# Patient Record
Sex: MALE | URBAN - METROPOLITAN AREA
[De-identification: names, ages, dates, MRNs, and addresses within clinical notes are randomized per-mention and may not be internally consistent; named-entity substitution may affect disease eponyms.]

---

## 2022-12-30 ENCOUNTER — OFFICE (OUTPATIENT)
Dept: URBAN - METROPOLITAN AREA CLINIC 109 | Facility: CLINIC | Age: 32
Setting detail: OPHTHALMOLOGY
End: 2022-12-30
Payer: COMMERCIAL

## 2022-12-30 DIAGNOSIS — H16.002: ICD-10-CM

## 2022-12-30 PROCEDURE — 99203 OFFICE O/P NEW LOW 30 MIN: CPT | Performed by: OPHTHALMOLOGY

## 2022-12-30 ASSESSMENT — CONFRONTATIONAL VISUAL FIELD TEST (CVF)
OD_FINDINGS: FULL
OS_FINDINGS: FULL

## 2022-12-30 ASSESSMENT — TONOMETRY
OD_IOP_MMHG: 15
OS_IOP_MMHG: 15

## 2022-12-30 ASSESSMENT — VISUAL ACUITY
OS_BCVA: 20/30-2
OD_BCVA: 20/20

## 2022-12-30 ASSESSMENT — LID EXAM ASSESSMENTS: OS_COMMENTS: NO FOREIGN BODY WITH LID EVERTED

## 2022-12-31 ENCOUNTER — OFFICE (OUTPATIENT)
Dept: URBAN - METROPOLITAN AREA CLINIC 38 | Facility: CLINIC | Age: 32
Setting detail: OPHTHALMOLOGY
End: 2022-12-31
Payer: COMMERCIAL

## 2022-12-31 DIAGNOSIS — H16.002: ICD-10-CM

## 2022-12-31 PROCEDURE — 99213 OFFICE O/P EST LOW 20 MIN: CPT | Performed by: OPTOMETRIST

## 2022-12-31 ASSESSMENT — LID EXAM ASSESSMENTS: OS_COMMENTS: NO FOREIGN BODY WITH LID EVERTED

## 2022-12-31 ASSESSMENT — CONFRONTATIONAL VISUAL FIELD TEST (CVF)
OS_FINDINGS: FULL
OD_FINDINGS: FULL

## 2022-12-31 ASSESSMENT — VISUAL ACUITY
OD_BCVA: 20/30-2
OS_BCVA: 20/20-1

## 2023-01-04 ENCOUNTER — OFFICE (OUTPATIENT)
Dept: URBAN - METROPOLITAN AREA CLINIC 109 | Facility: CLINIC | Age: 33
Setting detail: OPHTHALMOLOGY
End: 2023-01-04
Payer: COMMERCIAL

## 2023-01-04 DIAGNOSIS — H16.002: ICD-10-CM

## 2023-01-04 PROCEDURE — 99213 OFFICE O/P EST LOW 20 MIN: CPT | Performed by: OPHTHALMOLOGY

## 2023-01-04 ASSESSMENT — TONOMETRY
OD_IOP_MMHG: 12
OS_IOP_MMHG: 12

## 2023-01-04 ASSESSMENT — CONFRONTATIONAL VISUAL FIELD TEST (CVF)
OS_FINDINGS: FULL
OD_FINDINGS: FULL

## 2023-01-04 ASSESSMENT — VISUAL ACUITY
OD_BCVA: 20/20-2
OS_BCVA: 20/20

## 2023-01-11 ENCOUNTER — OFFICE (OUTPATIENT)
Dept: URBAN - METROPOLITAN AREA CLINIC 109 | Facility: CLINIC | Age: 33
Setting detail: OPHTHALMOLOGY
End: 2023-01-11
Payer: COMMERCIAL

## 2023-01-11 DIAGNOSIS — H16.002: ICD-10-CM

## 2023-01-11 PROCEDURE — 99213 OFFICE O/P EST LOW 20 MIN: CPT | Performed by: OPHTHALMOLOGY

## 2023-01-11 ASSESSMENT — CONFRONTATIONAL VISUAL FIELD TEST (CVF)
OS_FINDINGS: FULL
OD_FINDINGS: FULL

## 2023-01-11 ASSESSMENT — REFRACTION_CURRENTRX
OD_OVR_VA: 20/
OS_SPHERE: -2.75
OS_OVR_VA: 20/
OD_SPHERE: -2.50

## 2023-01-11 ASSESSMENT — TONOMETRY
OD_IOP_MMHG: 12
OS_IOP_MMHG: 14

## 2023-01-11 ASSESSMENT — REFRACTION_MANIFEST
OD_VA1: 20/20
OD_SPHERE: -2.50
OS_VA1: 20/20
OS_SPHERE: -2.75

## 2023-01-11 ASSESSMENT — VISUAL ACUITY
OS_BCVA: 20/20
OD_BCVA: 20/20

## 2023-10-25 ENCOUNTER — EMERGENCY (EMERGENCY)
Facility: HOSPITAL | Age: 33
LOS: 1 days | Discharge: HOME | End: 2023-10-25
Attending: EMERGENCY MEDICINE | Admitting: EMERGENCY MEDICINE
Payer: COMMERCIAL

## 2023-10-25 VITALS
OXYGEN SATURATION: 99 % | HEART RATE: 72 BPM | SYSTOLIC BLOOD PRESSURE: 142 MMHG | TEMPERATURE: 98 F | WEIGHT: 177.03 LBS | HEIGHT: 69 IN | RESPIRATION RATE: 15 BRPM | DIASTOLIC BLOOD PRESSURE: 87 MMHG

## 2023-10-25 LAB
ALBUMIN SERPL ELPH-MCNC: 4.1 G/DL — SIGNIFICANT CHANGE UP (ref 3.3–5)
ALBUMIN SERPL ELPH-MCNC: 4.1 G/DL — SIGNIFICANT CHANGE UP (ref 3.3–5)
ALP SERPL-CCNC: 65 U/L — SIGNIFICANT CHANGE UP (ref 30–120)
ALP SERPL-CCNC: 65 U/L — SIGNIFICANT CHANGE UP (ref 30–120)
ALT FLD-CCNC: 25 U/L — SIGNIFICANT CHANGE UP (ref 10–60)
ALT FLD-CCNC: 25 U/L — SIGNIFICANT CHANGE UP (ref 10–60)
ANION GAP SERPL CALC-SCNC: 4 MMOL/L — LOW (ref 5–17)
ANION GAP SERPL CALC-SCNC: 4 MMOL/L — LOW (ref 5–17)
AST SERPL-CCNC: 21 U/L — SIGNIFICANT CHANGE UP (ref 10–40)
AST SERPL-CCNC: 21 U/L — SIGNIFICANT CHANGE UP (ref 10–40)
BASOPHILS # BLD AUTO: 0.05 K/UL — SIGNIFICANT CHANGE UP (ref 0–0.2)
BASOPHILS # BLD AUTO: 0.05 K/UL — SIGNIFICANT CHANGE UP (ref 0–0.2)
BASOPHILS NFR BLD AUTO: 0.5 % — SIGNIFICANT CHANGE UP (ref 0–2)
BASOPHILS NFR BLD AUTO: 0.5 % — SIGNIFICANT CHANGE UP (ref 0–2)
BILIRUB SERPL-MCNC: 0.4 MG/DL — SIGNIFICANT CHANGE UP (ref 0.2–1.2)
BILIRUB SERPL-MCNC: 0.4 MG/DL — SIGNIFICANT CHANGE UP (ref 0.2–1.2)
BUN SERPL-MCNC: 12 MG/DL — SIGNIFICANT CHANGE UP (ref 7–23)
BUN SERPL-MCNC: 12 MG/DL — SIGNIFICANT CHANGE UP (ref 7–23)
CALCIUM SERPL-MCNC: 9.7 MG/DL — SIGNIFICANT CHANGE UP (ref 8.4–10.5)
CALCIUM SERPL-MCNC: 9.7 MG/DL — SIGNIFICANT CHANGE UP (ref 8.4–10.5)
CHLORIDE SERPL-SCNC: 103 MMOL/L — SIGNIFICANT CHANGE UP (ref 96–108)
CHLORIDE SERPL-SCNC: 103 MMOL/L — SIGNIFICANT CHANGE UP (ref 96–108)
CO2 SERPL-SCNC: 30 MMOL/L — SIGNIFICANT CHANGE UP (ref 22–31)
CO2 SERPL-SCNC: 30 MMOL/L — SIGNIFICANT CHANGE UP (ref 22–31)
CREAT SERPL-MCNC: 1.04 MG/DL — SIGNIFICANT CHANGE UP (ref 0.5–1.3)
CREAT SERPL-MCNC: 1.04 MG/DL — SIGNIFICANT CHANGE UP (ref 0.5–1.3)
EGFR: 97 ML/MIN/1.73M2 — SIGNIFICANT CHANGE UP
EGFR: 97 ML/MIN/1.73M2 — SIGNIFICANT CHANGE UP
EOSINOPHIL # BLD AUTO: 0.27 K/UL — SIGNIFICANT CHANGE UP (ref 0–0.5)
EOSINOPHIL # BLD AUTO: 0.27 K/UL — SIGNIFICANT CHANGE UP (ref 0–0.5)
EOSINOPHIL NFR BLD AUTO: 2.7 % — SIGNIFICANT CHANGE UP (ref 0–6)
EOSINOPHIL NFR BLD AUTO: 2.7 % — SIGNIFICANT CHANGE UP (ref 0–6)
GLUCOSE SERPL-MCNC: 94 MG/DL — SIGNIFICANT CHANGE UP (ref 70–99)
GLUCOSE SERPL-MCNC: 94 MG/DL — SIGNIFICANT CHANGE UP (ref 70–99)
HCT VFR BLD CALC: 45.3 % — SIGNIFICANT CHANGE UP (ref 39–50)
HCT VFR BLD CALC: 45.3 % — SIGNIFICANT CHANGE UP (ref 39–50)
HGB BLD-MCNC: 15.1 G/DL — SIGNIFICANT CHANGE UP (ref 13–17)
HGB BLD-MCNC: 15.1 G/DL — SIGNIFICANT CHANGE UP (ref 13–17)
IMM GRANULOCYTES NFR BLD AUTO: 0.2 % — SIGNIFICANT CHANGE UP (ref 0–0.9)
IMM GRANULOCYTES NFR BLD AUTO: 0.2 % — SIGNIFICANT CHANGE UP (ref 0–0.9)
LYMPHOCYTES # BLD AUTO: 2.45 K/UL — SIGNIFICANT CHANGE UP (ref 1–3.3)
LYMPHOCYTES # BLD AUTO: 2.45 K/UL — SIGNIFICANT CHANGE UP (ref 1–3.3)
LYMPHOCYTES # BLD AUTO: 24.1 % — SIGNIFICANT CHANGE UP (ref 13–44)
LYMPHOCYTES # BLD AUTO: 24.1 % — SIGNIFICANT CHANGE UP (ref 13–44)
MCHC RBC-ENTMCNC: 29.3 PG — SIGNIFICANT CHANGE UP (ref 27–34)
MCHC RBC-ENTMCNC: 29.3 PG — SIGNIFICANT CHANGE UP (ref 27–34)
MCHC RBC-ENTMCNC: 33.3 GM/DL — SIGNIFICANT CHANGE UP (ref 32–36)
MCHC RBC-ENTMCNC: 33.3 GM/DL — SIGNIFICANT CHANGE UP (ref 32–36)
MCV RBC AUTO: 87.8 FL — SIGNIFICANT CHANGE UP (ref 80–100)
MCV RBC AUTO: 87.8 FL — SIGNIFICANT CHANGE UP (ref 80–100)
MONOCYTES # BLD AUTO: 0.61 K/UL — SIGNIFICANT CHANGE UP (ref 0–0.9)
MONOCYTES # BLD AUTO: 0.61 K/UL — SIGNIFICANT CHANGE UP (ref 0–0.9)
MONOCYTES NFR BLD AUTO: 6 % — SIGNIFICANT CHANGE UP (ref 2–14)
MONOCYTES NFR BLD AUTO: 6 % — SIGNIFICANT CHANGE UP (ref 2–14)
NEUTROPHILS # BLD AUTO: 6.75 K/UL — SIGNIFICANT CHANGE UP (ref 1.8–7.4)
NEUTROPHILS # BLD AUTO: 6.75 K/UL — SIGNIFICANT CHANGE UP (ref 1.8–7.4)
NEUTROPHILS NFR BLD AUTO: 66.5 % — SIGNIFICANT CHANGE UP (ref 43–77)
NEUTROPHILS NFR BLD AUTO: 66.5 % — SIGNIFICANT CHANGE UP (ref 43–77)
NRBC # BLD: 0 /100 WBCS — SIGNIFICANT CHANGE UP (ref 0–0)
NRBC # BLD: 0 /100 WBCS — SIGNIFICANT CHANGE UP (ref 0–0)
PLATELET # BLD AUTO: 317 K/UL — SIGNIFICANT CHANGE UP (ref 150–400)
PLATELET # BLD AUTO: 317 K/UL — SIGNIFICANT CHANGE UP (ref 150–400)
POTASSIUM SERPL-MCNC: 4.7 MMOL/L — SIGNIFICANT CHANGE UP (ref 3.5–5.3)
POTASSIUM SERPL-MCNC: 4.7 MMOL/L — SIGNIFICANT CHANGE UP (ref 3.5–5.3)
POTASSIUM SERPL-SCNC: 4.7 MMOL/L — SIGNIFICANT CHANGE UP (ref 3.5–5.3)
POTASSIUM SERPL-SCNC: 4.7 MMOL/L — SIGNIFICANT CHANGE UP (ref 3.5–5.3)
PROT SERPL-MCNC: 7.2 G/DL — SIGNIFICANT CHANGE UP (ref 6–8.3)
PROT SERPL-MCNC: 7.2 G/DL — SIGNIFICANT CHANGE UP (ref 6–8.3)
RBC # BLD: 5.16 M/UL — SIGNIFICANT CHANGE UP (ref 4.2–5.8)
RBC # BLD: 5.16 M/UL — SIGNIFICANT CHANGE UP (ref 4.2–5.8)
RBC # FLD: 12.6 % — SIGNIFICANT CHANGE UP (ref 10.3–14.5)
RBC # FLD: 12.6 % — SIGNIFICANT CHANGE UP (ref 10.3–14.5)
SODIUM SERPL-SCNC: 137 MMOL/L — SIGNIFICANT CHANGE UP (ref 135–145)
SODIUM SERPL-SCNC: 137 MMOL/L — SIGNIFICANT CHANGE UP (ref 135–145)
WBC # BLD: 10.15 K/UL — SIGNIFICANT CHANGE UP (ref 3.8–10.5)
WBC # BLD: 10.15 K/UL — SIGNIFICANT CHANGE UP (ref 3.8–10.5)
WBC # FLD AUTO: 10.15 K/UL — SIGNIFICANT CHANGE UP (ref 3.8–10.5)
WBC # FLD AUTO: 10.15 K/UL — SIGNIFICANT CHANGE UP (ref 3.8–10.5)

## 2023-10-25 PROCEDURE — 80053 COMPREHEN METABOLIC PANEL: CPT

## 2023-10-25 PROCEDURE — 70450 CT HEAD/BRAIN W/O DYE: CPT | Mod: MA

## 2023-10-25 PROCEDURE — 85025 COMPLETE CBC W/AUTO DIFF WBC: CPT

## 2023-10-25 PROCEDURE — 99285 EMERGENCY DEPT VISIT HI MDM: CPT

## 2023-10-25 PROCEDURE — 36415 COLL VENOUS BLD VENIPUNCTURE: CPT

## 2023-10-25 PROCEDURE — 93005 ELECTROCARDIOGRAM TRACING: CPT

## 2023-10-25 PROCEDURE — 93010 ELECTROCARDIOGRAM REPORT: CPT

## 2023-10-25 PROCEDURE — 99285 EMERGENCY DEPT VISIT HI MDM: CPT | Mod: 25

## 2023-10-25 PROCEDURE — 70450 CT HEAD/BRAIN W/O DYE: CPT | Mod: 26,MA

## 2023-10-25 PROCEDURE — 96360 HYDRATION IV INFUSION INIT: CPT

## 2023-10-25 RX ORDER — MECLIZINE HCL 12.5 MG
1 TABLET ORAL
Qty: 15 | Refills: 0
Start: 2023-10-25 | End: 2023-10-29

## 2023-10-25 RX ORDER — SODIUM CHLORIDE 9 MG/ML
1000 INJECTION INTRAMUSCULAR; INTRAVENOUS; SUBCUTANEOUS ONCE
Refills: 0 | Status: COMPLETED | OUTPATIENT
Start: 2023-10-25 | End: 2023-10-25

## 2023-10-25 RX ORDER — MECLIZINE HCL 12.5 MG
25 TABLET ORAL ONCE
Refills: 0 | Status: COMPLETED | OUTPATIENT
Start: 2023-10-25 | End: 2023-10-25

## 2023-10-25 RX ORDER — FLUTICASONE PROPIONATE 50 MCG
50 SPRAY, SUSPENSION NASAL
Qty: 1 | Refills: 0
Start: 2023-10-25 | End: 2023-11-03

## 2023-10-25 RX ADMIN — SODIUM CHLORIDE 2000 MILLILITER(S): 9 INJECTION INTRAMUSCULAR; INTRAVENOUS; SUBCUTANEOUS at 12:01

## 2023-10-25 RX ADMIN — SODIUM CHLORIDE 1000 MILLILITER(S): 9 INJECTION INTRAMUSCULAR; INTRAVENOUS; SUBCUTANEOUS at 12:42

## 2023-10-25 RX ADMIN — Medication 25 MILLIGRAM(S): at 12:01

## 2023-10-25 NOTE — ED PROVIDER NOTE - NS ED ATTENDING STATEMENT MOD
Attending Only This was a shared visit with the TATIANNA. I reviewed and verified the documentation and independently performed the documented:

## 2023-10-25 NOTE — ED PROVIDER NOTE - NSFOLLOWUPINSTRUCTIONS_ED_ALL_ED_FT
-- You should update your primary care physician on your Emergency Department visit and follow up with them.  If you do not have a physician or have difficulty following up, please call: [phone] to obtain a [facility] doctor or specialist who can provide follow up.    -- Use nasal spray once a day, one spray in each nostril for the next 7 days    -- Use meclizine upto 3 times a day, as needed for dizziness.    -- If your symptoms don't improve in 1 week, follow up with referral to neurology    -- Return to the ER for worsening or persistent symptoms, and/or ANY NEW OR CONCERNING SYMPTOMS.

## 2023-10-25 NOTE — ED ADULT NURSE NOTE - SUICIDE SCREENING DEPRESSION
MD Dulce Maria Gilbert, [facility]  Caller: Unspecified (Yesterday,  6:18 PM)  ok    Negative

## 2023-10-25 NOTE — ED PROVIDER NOTE - OBJECTIVE STATEMENT
Pt is a 33-year-old male with no past medical history coming in for intermittent dizziness described as spinning and lightheadedness.  Patient states symptoms started yesterday worse with standing and laying flat and started positions.  Patient also complaining of some pressure in his head.  Patient denies any nausea vomiting blurry vision numbness tingling weakness chest pain or shortness of breath.  Patient states he had to go to work today and while coming off the ladder he felt like he was going to fall.  Patient states he has never had this before.  She denies any traumatic speech trouble walking.

## 2023-10-25 NOTE — ED PROVIDER NOTE - PATIENT PORTAL LINK FT
You can access the FollowMyHealth Patient Portal offered by [facility] by registering at the following website: [email or web address]. By joining [employer]’s FollowMyHealth portal, you will also be able to view your health information using other applications (apps) compatible with our system.

## 2023-10-25 NOTE — ED PROVIDER NOTE - CLINICAL SUMMARY MEDICAL DECISION MAKING FREE TEXT BOX
pt with mild dizziness, possibly vertigo or 2/2 mild sinusitis, ED work up otherwise unremarkable, stable for dc Zhang: pt with mild dizziness, possibly vertigo or 2/2 mild sinusitis, ED work up otherwise unremarkable, stable for dc

## 2023-10-25 NOTE — ED PROVIDER NOTE - CARE PROVIDER_API CALL
Martha Edward  Neurology  [address] [place] [address]  Phone: [phone]  Fax: [phone]  Follow Up Time:

## 2024-11-27 NOTE — ED ADULT NURSE NOTE - CADM POA PRESS ULCER
Possibly a contributor to hypoxemia  Started Flonase (has been prescribed antihistamines and nasal steroids but has run out), added guaifenesin  Will avoid giving Afrin given BP lability     No